# Patient Record
Sex: MALE | Race: WHITE | NOT HISPANIC OR LATINO | Employment: FULL TIME | URBAN - METROPOLITAN AREA
[De-identification: names, ages, dates, MRNs, and addresses within clinical notes are randomized per-mention and may not be internally consistent; named-entity substitution may affect disease eponyms.]

---

## 2021-06-23 ENCOUNTER — TELEPHONE (OUTPATIENT)
Dept: ORTHOPEDIC SURGERY | Facility: CLINIC | Age: 61
End: 2021-06-23

## 2021-06-23 NOTE — TELEPHONE ENCOUNTER
Caller: MORGAN VILLA     Relationship: SELF    Best call back number: [phone]    What is the best time to reach you: ANY    What was the call regarding: PATIENT WILL BE SELF PAY AND WOULD LIKE TO DISCUSS THE POSSIBLE COSTS OF HIS TREATMENT. IS AWARE OF THE $100 NEW PATIENT SELF PAY FEE. PATIENT HAS APPT WITH DR DUQUE ON 6/30/2021.    Do you require a callback: YES

## 2021-06-23 NOTE — TELEPHONE ENCOUNTER
CALLED PATIENT TO TELL HIM THAT HE PAYS $100 AT EVERY VISIT AND THE BILLING DEPARTMENT WILL SEND HIM A BILL, THEY GIVE SELF PAY PATIENTS A DISCOUNT AS WELL. PATIENT ASKED THE COST OF VISIT AND THINGS, GAVE PATIENT PHONE NUMBER TO BILLING DEPARTMENT.

## 2021-06-30 ENCOUNTER — OFFICE VISIT (OUTPATIENT)
Dept: ORTHOPEDIC SURGERY | Facility: CLINIC | Age: 61
End: 2021-06-30

## 2021-06-30 VITALS
BODY MASS INDEX: 26.15 KG/M2 | HEIGHT: 71 IN | DIASTOLIC BLOOD PRESSURE: 97 MMHG | SYSTOLIC BLOOD PRESSURE: 140 MMHG | HEART RATE: 108 BPM | WEIGHT: 186.8 LBS

## 2021-06-30 DIAGNOSIS — M25.511 RIGHT SHOULDER PAIN, UNSPECIFIED CHRONICITY: Primary | ICD-10-CM

## 2021-06-30 DIAGNOSIS — R29.898 SHOULDER WEAKNESS: ICD-10-CM

## 2021-06-30 DIAGNOSIS — S46.211A RUPTURE OF RIGHT PROXIMAL BICEPS TENDON, INITIAL ENCOUNTER: ICD-10-CM

## 2021-06-30 PROCEDURE — 99203 OFFICE O/P NEW LOW 30 MIN: CPT | Performed by: ORTHOPAEDIC SURGERY

## 2021-06-30 RX ORDER — NAPROXEN SODIUM 220 MG
220 TABLET ORAL ONCE
COMMUNITY

## 2021-06-30 RX ORDER — MULTIPLE VITAMINS W/ MINERALS TAB 9MG-400MCG
1 TAB ORAL DAILY
COMMUNITY

## 2021-06-30 RX ORDER — OMEPRAZOLE 20 MG/1
20 CAPSULE, DELAYED RELEASE ORAL AS NEEDED
COMMUNITY

## 2021-06-30 NOTE — PROGRESS NOTES
"      [facility] Note    Subjective     CC: Pain of the Right Shoulder      BRYON Olsen is a 61 y.o. male who presents with new problem of: right shoulder pain.  Onset: twisting injury. The issue has been ongoing for  3 week(s). Pain is a 8/10 on the pain scale. Pain is described as throbbing and shooting. Associated symptoms include pain and popping. The pain is worse with any movement of the joint; ice and pain medication and/or NSAID improve the pain. Previous treatments have included: NSAIDS.    I have reviewed the following portions of the patient's history:History of Present Illness and review of systems.      He was trying to start a chainsaw on June 16 of the popliteal shoulder.  Now he is unable to actively forward flex or raise his shoulder past 60 degrees    Review of Systems   Constitutional: Negative.  Negative for chills, fatigue and fever.   HENT: Negative.  Negative for congestion and dental problem.    Eyes: Negative.  Negative for blurred vision.   Respiratory: Negative.  Negative for shortness of breath.    Cardiovascular: Negative.  Negative for leg swelling.   Gastrointestinal: Negative.  Negative for abdominal pain.   Endocrine: Negative.  Negative for polyuria.   Genitourinary: Negative.  Negative for difficulty urinating.   Musculoskeletal: Positive for arthralgias.   Skin: Negative.    Allergic/Immunologic: Negative.    Neurological: Negative.    Hematological: Negative.  Negative for adenopathy.   Psychiatric/Behavioral: Negative.  Negative for behavioral problems.       ROS:    Constiutional:Pt denies fever, chills, nausea, or vomiting.  MSK:as above      Objective      Past Medical History  History reviewed. No pertinent past medical history.      Physical Exam  /97   Pulse 108   Ht 180 cm (70.87\")   Wt 84.7 kg (186 lb 12.8 oz)   BMI 26.15 kg/m²     Body mass index is 26.15 kg/m².    Patient is well nourished and well developed.        Ortho " Exam  Right shoulder with weakness of the rotator cuff.  He is unable to forward flex or abduct actively more than 60 degrees.  Passively he will go above 120.  He has a biceps deformity consistent with a biceps tendon tear    Imaging/Labs/EMG Reviewed:  Imaging Results (Last 24 Hours)     Procedure Component Value Units Date/Time    XR Shoulder 2+ View Right [[id number]] Resulted: 06/30/21 1536     Updated: 06/30/21 1537    Narrative:      Right Shoulder X-Ray  Indication: Pain  AP, scapular Y, and axillary lateral views    Findings:  No fracture  No bony lesion  Normal soft tissues  Normal joint spaces    No prior studies were available for comparison.            Assessment:  1. Right shoulder pain, unspecified chronicity    2. Shoulder weakness    3. Rupture of right proximal biceps tendon, initial encounter        Plan:  1. Recommend over the counter anti-inflammatories for pain and/or swelling  2. Clinically he has a rotator cuff tear.  He has complete lack of motor function of his supraspinatus rotator cuff.  3. I have ordered an MRI and we will see him back after the MRI    Follow Up:   Return for After MRI.      Medical Decision Making  Management Options : Low - 1 of 2 Categories = Category 1 - Review of Tests and Documents Category 2 - Assessment requiring an independent interpretation of tests         Virgilio Gonsalves M.D., FAAOS  Orthopedic Surgeon  Fellowship Trained Sports Medicine  [facility]  [address] [place] [address], [address]  [place] [address]

## 2021-07-08 ENCOUNTER — TELEPHONE (OUTPATIENT)
Dept: ORTHOPEDIC SURGERY | Facility: CLINIC | Age: 61
End: 2021-07-08

## 2021-07-08 NOTE — TELEPHONE ENCOUNTER
Dr. Gonsalves,    I know you are out so look at this when you get back no rush. Please see message below and advise thank you!  Domenica

## 2021-07-08 NOTE — TELEPHONE ENCOUNTER
Caller: MORGAN VILLA   Relationship to Patient: SELF     Phone Number: [phone]   Reason for Call: PATIENT CALLING STATING THAT HE MENTIONED TO DR DUQUE ABOUT FOREARM PAIN DURING HIS APPOINTMENT. PATIENT IS WONDERING WHY HE HAS NOT HEARD ANYTHING ABOUT WHAT IS GOING TO BE DONE ABOUT THE FOREARM PAIN, HE WOULD LIKE TO KNOW IF HE CAN HAVE IMAGING DONE THE SAME TIME AS HIS SHOULDER DUE TO BEING SELF PAY

## 2021-07-13 NOTE — TELEPHONE ENCOUNTER
Dr. Gonsalves's response:       I believe he probably tore his biceps tendon in the shoulder which may be causing his elbow and forearm pain.  Any imaging of his forearm would be a separate MRI and most likely an unnecessary expense.  I am happy to order another MRI of his forearm if he wants it but he probably does not need it

## 2025-03-03 ENCOUNTER — HOSPITAL ENCOUNTER (OUTPATIENT)
Dept: GENERAL RADIOLOGY | Facility: HOSPITAL | Age: 65
Discharge: HOME | End: 2025-03-03
Payer: MEDICARE

## 2025-03-03 ENCOUNTER — TRANSCRIBE ORDERS (OUTPATIENT)
Dept: GENERAL RADIOLOGY | Facility: HOSPITAL | Age: 65
End: 2025-03-03
Payer: MEDICARE

## 2025-03-03 DIAGNOSIS — S60.552A FOREIGN BODY OF LEFT HAND, INITIAL ENCOUNTER: Primary | ICD-10-CM

## 2025-03-03 DIAGNOSIS — S60.552A FOREIGN BODY OF LEFT HAND, INITIAL ENCOUNTER: ICD-10-CM

## 2025-03-03 PROCEDURE — 73130 X-RAY EXAM OF HAND: CPT
